# Patient Record
Sex: MALE | Race: WHITE | NOT HISPANIC OR LATINO
[De-identification: names, ages, dates, MRNs, and addresses within clinical notes are randomized per-mention and may not be internally consistent; named-entity substitution may affect disease eponyms.]

---

## 2020-08-10 ENCOUNTER — APPOINTMENT (OUTPATIENT)
Dept: PULMONOLOGY | Facility: CLINIC | Age: 55
End: 2020-08-10
Payer: COMMERCIAL

## 2020-08-10 VITALS
HEIGHT: 70 IN | TEMPERATURE: 98.6 F | OXYGEN SATURATION: 98 % | SYSTOLIC BLOOD PRESSURE: 120 MMHG | WEIGHT: 190 LBS | HEART RATE: 88 BPM | BODY MASS INDEX: 27.2 KG/M2 | DIASTOLIC BLOOD PRESSURE: 70 MMHG

## 2020-08-10 DIAGNOSIS — U07.1 COVID-19: ICD-10-CM

## 2020-08-10 DIAGNOSIS — Z83.3 FAMILY HISTORY OF DIABETES MELLITUS: ICD-10-CM

## 2020-08-10 DIAGNOSIS — Z83.518 FAMILY HISTORY OF OTHER SPECIFIED EYE DISORDER: ICD-10-CM

## 2020-08-10 DIAGNOSIS — Z82.49 FAMILY HISTORY OF ISCHEMIC HEART DISEASE AND OTHER DISEASES OF THE CIRCULATORY SYSTEM: ICD-10-CM

## 2020-08-10 DIAGNOSIS — Z87.19 PERSONAL HISTORY OF OTHER DISEASES OF THE DIGESTIVE SYSTEM: ICD-10-CM

## 2020-08-10 DIAGNOSIS — Z86.39 PERSONAL HISTORY OF OTHER ENDOCRINE, NUTRITIONAL AND METABOLIC DISEASE: ICD-10-CM

## 2020-08-10 DIAGNOSIS — Z78.9 OTHER SPECIFIED HEALTH STATUS: ICD-10-CM

## 2020-08-10 DIAGNOSIS — Z87.898 PERSONAL HISTORY OF OTHER SPECIFIED CONDITIONS: ICD-10-CM

## 2020-08-10 DIAGNOSIS — K50.10 CROHN'S DISEASE OF LARGE INTESTINE W/OUT COMPLICATIONS: ICD-10-CM

## 2020-08-10 DIAGNOSIS — F17.290 NICOTINE DEPENDENCE, OTHER TOBACCO PRODUCT, UNCOMPLICATED: ICD-10-CM

## 2020-08-10 DIAGNOSIS — Z86.19 PERSONAL HISTORY OF OTHER INFECTIOUS AND PARASITIC DISEASES: ICD-10-CM

## 2020-08-10 PROBLEM — Z00.00 ENCOUNTER FOR PREVENTIVE HEALTH EXAMINATION: Status: ACTIVE | Noted: 2020-08-10

## 2020-08-10 PROCEDURE — 94618 PULMONARY STRESS TESTING: CPT

## 2020-08-10 PROCEDURE — 99204 OFFICE O/P NEW MOD 45 MIN: CPT | Mod: 25

## 2020-08-10 RX ORDER — FINASTERIDE 1 MG/1
TABLET, FILM COATED ORAL
Refills: 0 | Status: ACTIVE | COMMUNITY

## 2020-08-10 NOTE — PHYSICAL EXAM
[No Acute Distress] : no acute distress [Normal Oropharynx] : normal oropharynx [II] : Mallampati Class: II [Normal Appearance] : normal appearance [No Neck Mass] : no neck mass [Normal Rate/Rhythm] : normal rate/rhythm [Normal S1, S2] : normal s1, s2 [No Murmurs] : no murmurs [No Abnormalities] : no abnormalities [No Resp Distress] : no resp distress [Clear to Auscultation Bilaterally] : clear to auscultation bilaterally [Benign] : benign [Normal Gait] : normal gait [No Clubbing] : no clubbing [No Edema] : no edema [No Cyanosis] : no cyanosis [Normal Color/ Pigmentation] : normal color/ pigmentation [FROM] : FROM [No Focal Deficits] : no focal deficits [Oriented x3] : oriented x3 [Normal Affect] : normal affect [TextBox_68] : I:E 1:3, mild crackles at the left base

## 2020-08-10 NOTE — PROCEDURE
[FreeTextEntry1] : 6 minute walk test reveals a low saturation of 96% with no evidence of dyspnea or fatigue; walked 668.3 meters \par \par Coronary CT (8.7.2020) reveals mild burden of coronary calcium. Bilateral bronchocentric ground glass opacities in the lower lung and a 2mm noncalcified nodule.\par

## 2020-08-10 NOTE — HISTORY OF PRESENT ILLNESS
[TextBox_4] : Mr. MYRANDA LIRIANO is a 55 year old male coming into the office for an initial evaluation. His chief complaint is abnormal CT. \par -his wife had gotten COVID-19 while visiting her father in the hospital. \par -he had gotten COVID-19 from his wife. \par -he reports having a cough. feeling chest tightness. \par -he states that he had a lot of diarrhea for 2 weeks. \par -he had lost a lot of weight during that time.\par -he later gained his weight back. \par -he is now feeling well.\par -reports a dry cough every once in a while.\par -denies SOB in the middle of the night, orthopnea. \par -reports that he has interrupted sleep.\par -he states that he is a  at night. \par -Memory and Concentration are good. \par -recently has been having a focusing issue. \par -his dreams have been more vivid since being diagnosed with COVID-19.\par -he states that he has very mild Crohn's disease.\par -energy level is 7/10. \par -sinuses have been active a couple of days ago. \par -generally does not have allergies. \par -he does not feel SOB on stairs and inclines. \par \par -He denies any constipation, dysphagia, dizziness, sour taste in the mouth, leg swelling, leg pain, itchy eyes, itchy ears, myalgias or arthralgias.

## 2020-08-10 NOTE — ASSESSMENT
[FreeTextEntry1] : Mr. MYRANDA LIRIANO is a 55 year old male who has a history of Alopecia, GERD, Chicken pox, elevated cholesterol, nonsmoker, GERD, Crohn's colitis, s/p COVID-19 in April 2020. who now comes in for pulmonary evaluation for Abnormal CT \par \par Problem 1:Abnormal CT with Residua COVID-19 Pneumonitis \par -CT is consistent with COVID-19 Pneumonitis. \par -Complete Blood Work : ESR, CRP, Hypersensitivity Panel. \par -Complete a High Resolution CT in One month (September)\par \par -Add a course of Prednisone; 30 mg for 7 days, then 20 mg for 7 days, then 10 mg for 7 days \par -Information sheet given to the patient to be reviewed, this medication is never to be used without consulting the prescribing physician. Proper dietary restraint is necessary specifically salt containing foods, if any reaction may occur should be reported.\par \par Problem 2: GERD\par -Add Pepcid 40mg QHS \par - Things to avoid including overeating, spicy foods, tight clothing, eating within three hours of bed, this list is not all inclusive.\par \par - For treatment of reflux, possible options discussed including diet control, H2 blockers, PPIs, as well as coating motility agents discussed as treatment options. Timing of meals and proximity of last meal to sleep were discussed. If symptoms persist, a formal gastrointestinal evaluation is needed. \par \par Problem 3:?RLS\par -Studies to complete: iron studies, thyroid function test, free and total testosterone level.\par - Restless Legs Syndrome (RLS), also known as Douglas-Ekbom Disease, is a common sleep -related movement disorder. About 1 in 10 adults in the [place] have problems from restless leg syndrome. It also can be seen in about 2% of children. Women are twice as likely as men to have RLS. People with RLS will have symptoms most often during times when they are less active, especially at bedtime. RLS most often causes an overwhelming urge to move your legs and sometimes other parts of your body. This urge is associated with unpleasant sensations in different parts of th body. The symptoms can be mild to severe and can affect your ability to go to sleep and stay asleep. People with RLS often sleep less at night and feel more tired during the day. \par \par Problem 4: ?ERASTO (Neck size 16.5)\par -Complete Home Sleep Study. \par -Sleep apnea is associated with adverse clinical consequences which an affect most organ systems. Cardiovascular disease risk includes arrhythmias, atrial fibrillation, hypertension, coronary artery disease, and stroke. Metabolic disorders include diabetes type 2, non-alcoholic fatty liver disease. Mood disorder especially depression; and cognitive decline especially in the elderly. Associations with chronic reflux/Song’s esophagus some but not all inclusive. \par -Reasons include arousal consistent with hypopnea; respiratory events most prominent in REM sleep or supine position; therefore sleep staging and body position are important for accurate diagnosis and estimation of AHI. \par \par Problem 5:IlDz ?Bronchiectasis \par -Complete Full PFTs and 6 minute walk test. \par \par Problem 6: s/p COVID-19 (April 2020)\par -Complete Blood Test for COVID-19 Antibody\par \par Problem 7: Health maintenance\par -?s/p flu shot\par -recommended strep pneumonia vaccines: Prevnar-13 vaccine, followed by Pneumo vaccine 23 one year following (after the age of 65)\par -recommended early intervention for URIs\par -recommended regular osteoporosis evaluations\par -recommended early dermatological evaluations\par -recommended after the age of 50 to the age of 70, colonoscopy every 5 years\par \par f/u in 6-8 weeks\par pt is encouraged to call or fax the office with any questions or concerns.

## 2020-08-10 NOTE — ADDENDUM
[FreeTextEntry1] : Documented by Gustabo Villalobos acting as a scribe for Dr. Al De La Rosa on 08/10/2020 \par \par All medical record entries made by the Scribe were at my, Dr. Al De La Rosa's, direction and personally dictated by me on 08/10/2020 . I have reviewed the chart and agree that the record accurately reflects my personal performance of the history, physical exam, assessment and plan. I have also personally directed, reviewed, and agree with the discharge instructions.

## 2020-08-10 NOTE — REASON FOR VISIT
[Initial] : an initial visit [TextBox_44] : Abnormal CT, GERD, ?RLS, ?ERASTO, IlDz ?Bronchiectasis, s/p COVID-19.

## 2020-08-11 ENCOUNTER — TRANSCRIPTION ENCOUNTER (OUTPATIENT)
Age: 55
End: 2020-08-11

## 2020-08-11 DIAGNOSIS — Z01.812 ENCOUNTER FOR PREPROCEDURAL LABORATORY EXAMINATION: ICD-10-CM

## 2020-08-11 LAB
ALBUMIN SERPL ELPH-MCNC: 4.7 G/DL
ALP BLD-CCNC: 64 U/L
ALT SERPL-CCNC: 13 U/L
ANION GAP SERPL CALC-SCNC: 11 MMOL/L
AST SERPL-CCNC: 16 U/L
BASOPHILS # BLD AUTO: 0.03 K/UL
BASOPHILS NFR BLD AUTO: 0.7 %
BILIRUB SERPL-MCNC: 0.3 MG/DL
BUN SERPL-MCNC: 22 MG/DL
CALCIUM SERPL-MCNC: 9.2 MG/DL
CHLORIDE SERPL-SCNC: 105 MMOL/L
CHOLEST SERPL-MCNC: 215 MG/DL
CHOLEST/HDLC SERPL: 3.7 RATIO
CO2 SERPL-SCNC: 26 MMOL/L
CREAT SERPL-MCNC: 1.06 MG/DL
CRP SERPL-MCNC: <0.1 MG/DL
EOSINOPHIL # BLD AUTO: 0.11 K/UL
EOSINOPHIL NFR BLD AUTO: 2.4 %
ERYTHROCYTE [SEDIMENTATION RATE] IN BLOOD BY WESTERGREN METHOD: 5 MM/HR
FERRITIN SERPL-MCNC: 101 NG/ML
FERRITIN SERPL-MCNC: 101 NG/ML
GLUCOSE SERPL-MCNC: 112 MG/DL
HCT VFR BLD CALC: 45.2 %
HDLC SERPL-MCNC: 58 MG/DL
HGB BLD-MCNC: 14.1 G/DL
IMM GRANULOCYTES NFR BLD AUTO: 0.2 %
IRON SATN MFR SERPL: 22 %
IRON SATN MFR SERPL: 23 %
IRON SERPL-MCNC: 73 UG/DL
IRON SERPL-MCNC: 74 UG/DL
LDLC SERPL CALC-MCNC: 141 MG/DL
LYMPHOCYTES # BLD AUTO: 1.4 K/UL
LYMPHOCYTES NFR BLD AUTO: 31 %
MAN DIFF?: NORMAL
MCHC RBC-ENTMCNC: 28.3 PG
MCHC RBC-ENTMCNC: 31.2 GM/DL
MCV RBC AUTO: 90.8 FL
MONOCYTES # BLD AUTO: 0.35 K/UL
MONOCYTES NFR BLD AUTO: 7.7 %
NEUTROPHILS # BLD AUTO: 2.62 K/UL
NEUTROPHILS NFR BLD AUTO: 58 %
PLATELET # BLD AUTO: 183 K/UL
POTASSIUM SERPL-SCNC: 5.2 MMOL/L
PROT SERPL-MCNC: 6.7 G/DL
RBC # BLD: 4.98 M/UL
RBC # FLD: 13.5 %
SODIUM SERPL-SCNC: 141 MMOL/L
T3FREE SERPL-MCNC: 2.79 PG/ML
T4 FREE SERPL-MCNC: 1 NG/DL
TIBC SERPL-MCNC: 322 UG/DL
TIBC SERPL-MCNC: 330 UG/DL
TRIGL SERPL-MCNC: 77 MG/DL
TSH SERPL-ACNC: 0.98 UIU/ML
UIBC SERPL-MCNC: 249 UG/DL
UIBC SERPL-MCNC: 256 UG/DL
WBC # FLD AUTO: 4.52 K/UL

## 2020-08-12 LAB
SARS-COV-2 IGG SERPL IA-ACNC: 17.8 INDEX
SARS-COV-2 IGG SERPL QL IA: POSITIVE

## 2020-08-13 LAB
ALTERN TENCAPG(M6): 5.5 MCG/ML
ASPER FUMCAPG(M3): 11.8 MCG/ML
AUREOBASCAPG(M12): 4 MCG/ML
MICROPOLYCAPG(M22): 3.5 MCG/ML
PENIC CHRYCAPG(M1): 7 MCG/ML
PHOMA BETAE IGG: 4.7 MCG/ML
THERMOCAPG(M23): 15.7 MCG/ML
TRICHODERMA VIRIDE IGG: 4.1 MCG/ML

## 2020-08-14 LAB
TESTOST BND SERPL-MCNC: 7.6 PG/ML
TESTOST SERPL-MCNC: 319.6 NG/DL

## 2020-09-02 LAB — SARS-COV-2 N GENE NPH QL NAA+PROBE: NOT DETECTED

## 2020-09-04 ENCOUNTER — APPOINTMENT (OUTPATIENT)
Dept: PULMONOLOGY | Facility: CLINIC | Age: 55
End: 2020-09-04
Payer: COMMERCIAL

## 2020-09-04 PROCEDURE — 94729 DIFFUSING CAPACITY: CPT

## 2020-09-04 PROCEDURE — 94727 GAS DIL/WSHOT DETER LNG VOL: CPT

## 2020-09-04 PROCEDURE — 94010 BREATHING CAPACITY TEST: CPT

## 2020-09-22 ENCOUNTER — APPOINTMENT (OUTPATIENT)
Dept: PULMONOLOGY | Facility: CLINIC | Age: 55
End: 2020-09-22
Payer: COMMERCIAL

## 2020-09-22 VITALS
RESPIRATION RATE: 16 BRPM | SYSTOLIC BLOOD PRESSURE: 120 MMHG | OXYGEN SATURATION: 98 % | BODY MASS INDEX: 29.4 KG/M2 | WEIGHT: 194 LBS | HEIGHT: 68 IN | HEART RATE: 106 BPM | DIASTOLIC BLOOD PRESSURE: 70 MMHG | TEMPERATURE: 98.1 F

## 2020-09-22 DIAGNOSIS — Z23 ENCOUNTER FOR IMMUNIZATION: ICD-10-CM

## 2020-09-22 PROCEDURE — 90682 RIV4 VACC RECOMBINANT DNA IM: CPT

## 2020-09-22 PROCEDURE — 99214 OFFICE O/P EST MOD 30 MIN: CPT | Mod: 25

## 2020-09-22 PROCEDURE — G0008: CPT

## 2020-09-22 NOTE — PHYSICAL EXAM

## 2020-09-22 NOTE — PROCEDURE
[FreeTextEntry1] : CT (SEP.9.2020) IMPRESSION: GROUNDGLASS nodularity, scarring or inflammation right lower lobe and to lesser extent left lower lung. similar finding 8/7/2020. Several punctate calcified granulomas costophrenic angles. 3 x  mm nodule lingula. similar dining partially visualized 8/7/2020/ Consider follow up study in 6 months.

## 2020-09-22 NOTE — REASON FOR VISIT
[Follow-Up] : a follow-up visit [TextBox_44] : Abnormal CT, GERD, ?RLS, ?ERASTO, IlDz ?Bronchiectasis, s/p COVID-19.

## 2020-09-22 NOTE — ASSESSMENT
[FreeTextEntry1] : Mr. MYRANDA LIRIANO is a 55 year old male who has a history of Alopecia, GERD, Chicken pox, elevated cholesterol, nonsmoker, GERD, Crohn's colitis, s/p COVID-19 in April 2020. who now comes in for a follow up pulmonary evaluation for Abnormal CT \par \par Problem 1:Abnormal CT with Residua COVID-19 Pneumonitis \par -CT is consistent with COVID-19 Pneumonitis. \par -Complete Blood Work : ESR, CRP, Hypersensitivity Panel. \par -s/p High Resolution CT (September) - residual. Complete next CT 3/2021\par \par \par Problem 2: GERD\par -continue Pepcid 40mg QHS \par - Things to avoid including overeating, spicy foods, tight clothing, eating within three hours of bed, this list is not all inclusive.\par - For treatment of reflux, possible options discussed including diet control, H2 blockers, PPIs, as well as coating motility agents discussed as treatment options. Timing of meals and proximity of last meal to sleep were discussed. If symptoms persist, a formal gastrointestinal evaluation is needed. \par \par Problem 3:?RLS\par -Studies to complete: iron studies, thyroid function test, free and total testosterone level.\par - Restless Legs Syndrome (RLS), also known as Douglas-Ekbom Disease, is a common sleep -related movement disorder. About 1 in 10 adults in the [place] have problems from restless leg syndrome. It also can be seen in about 2% of children. Women are twice as likely as men to have RLS. People with RLS will have symptoms most often during times when they are less active, especially at bedtime. RLS most often causes an overwhelming urge to move your legs and sometimes other parts of your body. This urge is associated with unpleasant sensations in different parts of th body. The symptoms can be mild to severe and can affect your ability to go to sleep and stay asleep. People with RLS often sleep less at night and feel more tired during the day. \par \par Problem 4: ?ERASTO (Neck size 16.5)\par -Complete Home Sleep Study. \par -Sleep apnea is associated with adverse clinical consequences which an affect most organ systems. Cardiovascular disease risk includes arrhythmias, atrial fibrillation, hypertension, coronary artery disease, and stroke. Metabolic disorders include diabetes type 2, non-alcoholic fatty liver disease. Mood disorder especially depression; and cognitive decline especially in the elderly. Associations with chronic reflux/Song’s esophagus some but not all inclusive. \par -Reasons include arousal consistent with hypopnea; respiratory events most prominent in REM sleep or supine position; therefore sleep staging and body position are important for accurate diagnosis and estimation of AHI. \par \par Problem 5:IlDz ?Bronchiectasis \par -s/p Full PFTs and 6 minute walk test. \par \par Problem 6: s/p COVID-19 (April 2020)\par -s/p Blood Test for COVID-19 Antibody (+)\par \par Problem 7: Health maintenance\par -?s/p flu shot\par -recommended strep pneumonia vaccines: Prevnar-13 vaccine, followed by Pneumo vaccine 23 one year following (after the age of 65)\par -recommended early intervention for URIs\par -recommended regular osteoporosis evaluations\par -recommended early dermatological evaluations\par -recommended after the age of 50 to the age of 70, colonoscopy every 5 years\par \par f/u in 4 months with a CT\par pt is encouraged to call or fax the office with any questions or concerns.

## 2020-09-22 NOTE — HISTORY OF PRESENT ILLNESS
[TextBox_4] : Mr. MYRANDA LIRIANO is a 55 year old male with a history of abnormal CT, GERD, PNA from Covid-19, poor sleep, RLS, and SOB coming into the office for a follow up pulmonary evaluation. His chief complaint is s/p covid\par -he reports feeling generally well\par -he states he feels tired by 10 PM now. He wakes up at 7 AM. He gets 6-7 hours of sleep nightly\par -he began to run 1-2 miles 3x per week\par -he reports he wakes up with nocturia every few days. His wife adds he rarely snores\par -he notes he has been having more and odd dreams, different from before Covid\par -he denies any visual issues, chest pain, chest pressure, diarrhea, constipation, dysphagia, dizziness, sour taste in the mouth, leg swelling, leg pain, itchy eyes, itchy ears, heartburn, reflux, myalgias or arthralgias.

## 2020-09-22 NOTE — ADDENDUM
[FreeTextEntry1] : Documented by Charles Tran acting as a scribe for Dr. Al De La Rosa on 09/22/2020.\par \par All medical record entries made by the Scribe were at my, Dr. Al De La Rosa's, direction and personally dictated by me on 09/22/2020. I have reviewed the chart and agree that the record accurately reflects my personal performance of the history, physical exam, assessment and plan. I have also personally directed, reviewed, and agree with the discharge instructions.

## 2021-02-23 ENCOUNTER — NON-APPOINTMENT (OUTPATIENT)
Age: 56
End: 2021-02-23

## 2021-04-14 LAB — SARS-COV-2 N GENE NPH QL NAA+PROBE: NOT DETECTED

## 2021-04-16 ENCOUNTER — APPOINTMENT (OUTPATIENT)
Dept: PULMONOLOGY | Facility: CLINIC | Age: 56
End: 2021-04-16
Payer: COMMERCIAL

## 2021-04-16 VITALS
OXYGEN SATURATION: 98 % | DIASTOLIC BLOOD PRESSURE: 70 MMHG | WEIGHT: 194 LBS | HEART RATE: 68 BPM | TEMPERATURE: 98 F | HEIGHT: 69 IN | RESPIRATION RATE: 16 BRPM | SYSTOLIC BLOOD PRESSURE: 110 MMHG | BODY MASS INDEX: 28.73 KG/M2

## 2021-04-16 PROCEDURE — ZZZZZ: CPT

## 2021-04-16 PROCEDURE — 95012 NITRIC OXIDE EXP GAS DETER: CPT

## 2021-04-16 PROCEDURE — 99072 ADDL SUPL MATRL&STAF TM PHE: CPT

## 2021-04-16 PROCEDURE — 94010 BREATHING CAPACITY TEST: CPT

## 2021-04-16 PROCEDURE — 94729 DIFFUSING CAPACITY: CPT

## 2021-04-16 PROCEDURE — 94618 PULMONARY STRESS TESTING: CPT

## 2021-04-16 PROCEDURE — 99213 OFFICE O/P EST LOW 20 MIN: CPT | Mod: 25

## 2021-04-16 NOTE — HISTORY OF PRESENT ILLNESS
[TextBox_4] : Mr. MYRANDA LIRIANO is a 55 year old male with a history of abnormal CT, GERD, PNA from Covid-19, poor sleep, RLS, and SOB coming into the office for a follow up pulmonary evaluation. His chief complaint is\par - he has been feeling fine overall \par - he notes he has been having very vivid dreams \par - no SOB \par - He has been exercising \par - he has a dry cough once in awhile \par - no palpitations \par - denies any snoring \par - his sleep has been well \par - He  denies any visual issues, headaches, nausea, vomiting, fever, chills, sweats, chest pains, chest pressure, diarrhea, constipation, dysphagia, myalgia, dizziness, leg swelling, leg pain, itchy eyes, itchy ears, heartburn, reflux, or sour taste in the mouth.\par

## 2021-04-16 NOTE — PHYSICAL EXAM
[No Acute Distress] : no acute distress [Normal Oropharynx] : normal oropharynx [Normal Appearance] : normal appearance [No Neck Mass] : no neck mass [Normal Rate/Rhythm] : normal rate/rhythm [Normal S1, S2] : normal s1, s2 [No Murmurs] : no murmurs [No Resp Distress] : no resp distress [Clear to Auscultation Bilaterally] : clear to auscultation bilaterally [No Abnormalities] : no abnormalities [Benign] : benign [Normal Gait] : normal gait [No Clubbing] : no clubbing [No Cyanosis] : no cyanosis [No Edema] : no edema [FROM] : FROM [Normal Color/ Pigmentation] : normal color/ pigmentation [No Focal Deficits] : no focal deficits [Oriented x3] : oriented x3 [Normal Affect] : normal affect [III] : Mallampati Class: III [TextBox_68] : I:E 1:3, clear

## 2021-04-16 NOTE — PROCEDURE
[FreeTextEntry1] : CT (2/5/2021) reveals mild gg opacity in the posterior lower lobes bilaterally, unchanged. Stable tiny bilateral pulmonary nodules \par \par 6 minute walk test reveals a low saturation of 97 % with no evidence of dyspnea or fatigue; walked  586.8 meters \par \par Full PFT revealed normal flows, with a FEV1 of 4.28L, which is 113% of predicted, normal lung volumes, and a diffusion of 25.1, which is 97% of predicted, with a normal flow volume loop \par \par  FENO was 25; normal value being less than 25\par Fractional exhaled nitric oxide (FENO) is regarded as a simple, noninvasive method for assessing eosinophilic airway inflammation. Produced by a variety of cells within the lung, nitric oxide (NO) concentrations are generally low in healthy individuals. However, high concentrations of NO appear to be involved in nonspecific host defense mechanisms and chronic inflammatory diseases such as asthma. The American Thor[employer] Society (ATS) therefore has recommended using FENO to aid in the diagnosis and monitoring of eosinophilic airway inflammation and asthma, and for identifying steroid responsive individuals whose chronic respiratory symptoms may be airway inflammation.

## 2021-04-16 NOTE — ASSESSMENT
[FreeTextEntry1] : Mr. MYRANDA LIRIANO is a 55 year old male who has a history of Alopecia, GERD, Chicken pox, elevated cholesterol, nonsmoker, GERD, Crohn's colitis, s/p COVID-19 in April 2020. who now comes in for a follow up pulmonary evaluation for Abnormal CT - improved \par \par Problem 1:Abnormal CT with Residua COVID-19 Pneumonitis (improved)\par -CT is consistent with COVID-19 Pneumonitis. \par -s/p Blood Work : ESR, CRP, Hypersensitivity Panel. \par -s/p High Resolution CT (September) - residual. s/p CT 3/2021-next  3/2022\par \par \par Problem 2: GERD\par -continue Pepcid 40mg QHS \par - Things to avoid including overeating, spicy foods, tight clothing, eating within three hours of bed, this list is not all inclusive.\par - For treatment of reflux, possible options discussed including diet control, H2 blockers, PPIs, as well as coating motility agents discussed as treatment options. Timing of meals and proximity of last meal to sleep were discussed. If symptoms persist, a formal gastrointestinal evaluation is needed. \par \par Problem 3:?RLS\par -Studies to complete: iron studies, thyroid function test, free and total testosterone level.\par - Restless Legs Syndrome (RLS), also known as Douglas-Ekbom Disease, is a common sleep -related movement disorder. About 1 in 10 adults in the [place] have problems from restless leg syndrome. It also can be seen in about 2% of children. Women are twice as likely as men to have RLS. People with RLS will have symptoms most often during times when they are less active, especially at bedtime. RLS most often causes an overwhelming urge to move your legs and sometimes other parts of your body. This urge is associated with unpleasant sensations in different parts of th body. The symptoms can be mild to severe and can affect your ability to go to sleep and stay asleep. People with RLS often sleep less at night and feel more tired during the day. \par \par Problem 4: ?ERASTO (Neck size 16.5)\par -Complete Home Sleep Study - if needed \par -Sleep apnea is associated with adverse clinical consequences which an affect most organ systems. Cardiovascular disease risk includes arrhythmias, atrial fibrillation, hypertension, coronary artery disease, and stroke. Metabolic disorders include diabetes type 2, non-alcoholic fatty liver disease. Mood disorder especially depression; and cognitive decline especially in the elderly. Associations with chronic reflux/Song’s esophagus some but not all inclusive. \par -Reasons include arousal consistent with hypopnea; respiratory events most prominent in REM sleep or supine position; therefore sleep staging and body position are important for accurate diagnosis and estimation of AHI. \par \par Problem 5:IlDz ?Bronchiectasis \par -s/p Full PFTs and 6 minute walk test. \par \par Problem 6: s/p COVID-19 (April 2020) - vaccine concerns \par -s/p Blood Test for COVID-19 Antibody (+)\par \par Problem 7: Health maintenance\par -?s/p flu shot\par -recommended strep pneumonia vaccines: Prevnar-13 vaccine, followed by Pneumo vaccine 23 one year following (after the age of 65)\par -recommended early intervention for URIs\par -recommended regular osteoporosis evaluations\par -recommended early dermatological evaluations\par -recommended after the age of 50 to the age of 70, colonoscopy every 5 years\par \par f/u in 4 months with a CT\par pt is encouraged to call or fax the office with any questions or concerns.

## 2021-04-16 NOTE — ADDENDUM
[FreeTextEntry1] : Documented by Hafsa Nj acting as a scribe for Dr. Al De La Rosa on 04/16/2021 \par \par All medical record entries made by the Scribe were at my, Dr. Al De La Rosa's, direction and personally dictated by me on 04/16/2021 . I have reviewed the chart and agree that the record accurately reflects my personal performance of the history, physical exam, assessment and plan. I have also personally directed, reviewed, and agree with the discharge instructions.

## 2022-06-24 ENCOUNTER — NON-APPOINTMENT (OUTPATIENT)
Age: 57
End: 2022-06-24

## 2022-06-24 ENCOUNTER — APPOINTMENT (OUTPATIENT)
Dept: PULMONOLOGY | Facility: CLINIC | Age: 57
End: 2022-06-24
Payer: COMMERCIAL

## 2022-06-24 VITALS
OXYGEN SATURATION: 98 % | DIASTOLIC BLOOD PRESSURE: 72 MMHG | HEIGHT: 71 IN | TEMPERATURE: 97.3 F | WEIGHT: 191 LBS | BODY MASS INDEX: 26.74 KG/M2 | RESPIRATION RATE: 16 BRPM | HEART RATE: 74 BPM | SYSTOLIC BLOOD PRESSURE: 120 MMHG

## 2022-06-24 DIAGNOSIS — Z72.820 SLEEP DEPRIVATION: ICD-10-CM

## 2022-06-24 PROCEDURE — 94010 BREATHING CAPACITY TEST: CPT

## 2022-06-24 PROCEDURE — 95012 NITRIC OXIDE EXP GAS DETER: CPT

## 2022-06-24 PROCEDURE — 99214 OFFICE O/P EST MOD 30 MIN: CPT | Mod: 25

## 2022-06-24 RX ORDER — ROSUVASTATIN CALCIUM 10 MG/1
10 TABLET, FILM COATED ORAL
Qty: 30 | Refills: 0 | Status: ACTIVE | COMMUNITY
Start: 2021-11-26

## 2022-06-24 RX ORDER — VALACYCLOVIR 1 G/1
1 TABLET, FILM COATED ORAL
Qty: 30 | Refills: 0 | Status: ACTIVE | COMMUNITY
Start: 2022-06-13

## 2022-06-24 RX ORDER — PANTOPRAZOLE 40 MG/1
40 TABLET, DELAYED RELEASE ORAL
Qty: 30 | Refills: 0 | Status: ACTIVE | COMMUNITY
Start: 2022-02-07

## 2022-06-24 RX ORDER — MESALAMINE 400 MG/1
400 CAPSULE, DELAYED RELEASE ORAL
Qty: 180 | Refills: 0 | Status: ACTIVE | COMMUNITY
Start: 2022-05-17

## 2022-06-24 NOTE — ASSESSMENT
[FreeTextEntry1] : Mr. MYRANDA LIRIANO is a 57 year old male who has a history of Alopecia, GERD, Chicken pox, elevated cholesterol, nonsmoker, GERD, Crohn's colitis, s/p COVID-19 in April 2020. who now comes in for a follow up pulmonary evaluation for Abnormal CT - improved - s/p COVID-19 5/2022 \par \par Problem 1:Abnormal CT with Residua COVID-19 Pneumonitis (improved)\par -CT is consistent with COVID-19 Pneumonitis. \par -s/p Blood Work : ESR, CRP, Hypersensitivity Panel. \par -s/p High Resolution CT (September) - residual. s/p CT 3/2021-next  3/2022 (over due)\par -CAT scans are the only radiological modality to identify abnormalities w/in the lings with regards to nodules/masses/lymph nodes. Risks, benefits were reviewed in detail. The guidelines for abnormalities include follow up CT scans at various intervals which could range from 6 weeks to 1 year intervals. If there is a change for the worse then considerations for a biopsy will be considered if you are a candidate. Second opinion evaluation with thoracic surgeon or an interventional radiologist could be offered,			 \par \par Problem 2: GERD\par -continue Pepcid 40mg QHS \par - Things to avoid including overeating, spicy foods, tight clothing, eating within three hours of bed, this list is not all inclusive.\par - For treatment of reflux, possible options discussed including diet control, H2 blockers, PPIs, as well as coating motility agents discussed as treatment options. Timing of meals and proximity of last meal to sleep were discussed. If symptoms persist, a formal gastrointestinal evaluation is needed. \par \par Problem 3:?RLS\par -Studies to complete: iron studies, thyroid function test, free and total testosterone level.\par - Restless Legs Syndrome (RLS), also known as Douglas-Ekbom Disease, is a common sleep -related movement disorder. About 1 in 10 adults in the [place] have problems from restless leg syndrome. It also can be seen in about 2% of children. Women are twice as likely as men to have RLS. People with RLS will have symptoms most often during times when they are less active, especially at bedtime. RLS most often causes an overwhelming urge to move your legs and sometimes other parts of your body. This urge is associated with unpleasant sensations in different parts of th body. The symptoms can be mild to severe and can affect your ability to go to sleep and stay asleep. People with RLS often sleep less at night and feel more tired during the day. \par \par Problem 4: ?ERASTO (Neck size 16.5)\par -Complete Home Sleep Study - if needed \par -Sleep apnea is associated with adverse clinical consequences which an affect most organ systems. Cardiovascular disease risk includes arrhythmias, atrial fibrillation, hypertension, coronary artery disease, and stroke. Metabolic disorders include diabetes type 2, non-alcoholic fatty liver disease. Mood disorder especially depression; and cognitive decline especially in the elderly. Associations with chronic reflux/Song’s esophagus some but not all inclusive. \par -Reasons include arousal consistent with hypopnea; respiratory events most prominent in REM sleep or supine position; therefore sleep staging and body position are important for accurate diagnosis and estimation of AHI. \par \par Problem 5:IlDz ?Bronchiectasis \par -s/p Full PFTs and 6 minute walk test. \par -Seen on the CT of the chest or chest x-ray signifies damaged bronchial tubes focal or diffuse which can be sites of recurrent infections. These areas can be colonized by various organisms including bacteria (hemophilous influenza/Pseudomonas species etc.) as well as acid fast bacilli (myobacterial disease- inclusive of TB/NIMCO etc.). Sputum either for bacteria culture/sensitivity or AFB culture and sensitivity will need to be sent if the patient has sputum- 3 specimens on consecutive days will need to be dropped at the laboratory- if the patient can produce sputum. \par \par Problem 6: s/p COVID-19 (4/2020, 5/2022) - vaccine concerns \par -Recommended Frank Kowalski's 10-day detox diet and book. Then, complete a course of probiotics.\par -s/p Blood Test for COVID-19 Antibody (+)\par \par Problem 7: Health maintenance\par -recommended book "[employer]" by Ta Rebollar\par -?s/p flu shot\par -recommended strep pneumonia vaccines: Prevnar-13 vaccine, followed by Pneumo vaccine 23 one year following (after the age of 65)\par -recommended early intervention for URIs\par -recommended regular osteoporosis evaluations\par -recommended early dermatological evaluations\par -recommended after the age of 50 to the age of 70, colonoscopy every 5 years\par \par f/u in 4 months with a CT\par pt is encouraged to call or fax the office with any questions or concerns.

## 2022-06-24 NOTE — PROCEDURE
[FreeTextEntry1] : Feno was 28; a normal value being less than 25. Fractional exhaled nitric oxide (FENO) is regarded as a simple, noninvasive method for assessing eosinophilic airway inflammation. Produced by a variety of cells within the lung, nitric oxide (NO) concentrations are generally low in healthy individuals. However, high concentrations of NO appear to be involved in nonspecific host defense mechanisms and chronic inflammatory  diseases such as asthma. The American Thoracic Society (ATS) therefore recommended using FENO to aid in the diagnosis and monitoring of eosinophilic airway inflammation and asthma, and for identifying steroid responsive individuals whose chronic respiratory symptoms may be caused by airway inflammation \par \par PFT revealed normal flows, with a FEV1 of 4.36L, which is 112% of predicted, with a normal flow volume loop

## 2022-06-24 NOTE — PHYSICAL EXAM
[No Acute Distress] : no acute distress [Normal Oropharynx] : normal oropharynx [Normal Appearance] : normal appearance [No Neck Mass] : no neck mass [Normal Rate/Rhythm] : normal rate/rhythm [Normal S1, S2] : normal s1, s2 [No Murmurs] : no murmurs [No Resp Distress] : no resp distress [Clear to Auscultation Bilaterally] : clear to auscultation bilaterally [No Abnormalities] : no abnormalities [Benign] : benign [Normal Gait] : normal gait [No Clubbing] : no clubbing [No Cyanosis] : no cyanosis [No Edema] : no edema [FROM] : FROM [Normal Color/ Pigmentation] : normal color/ pigmentation [No Focal Deficits] : no focal deficits [Oriented x3] : oriented x3 [Normal Affect] : normal affect [II] : Mallampati Class: II [TextBox_68] : I:E 1:3, clear

## 2022-06-24 NOTE — HISTORY OF PRESENT ILLNESS
[TextBox_4] : Mr. MYRANDA LIRIANO is a 57 year old male with a history of abnormal CT, GERD, PNA from Covid-19, poor sleep, RLS, and SOB coming into the office for a follow up pulmonary evaluation. His chief complaint is\par -he notes that he has another COVID infection about a month ago (5/2022)\par -he notes that the infection stayed above the neck (did develop dry cough which resolved and had fatigue)\par -s/p Paxlovid (metallic taste Sx)\par -he notes that he is sleeping well \par -he notes that he gets tired at 5-6 PM and takes a nap \par -he notes he gets up earlier in the morning (6AM, used to wake up 7 or 8 AM)\par -he notes snoring \par -he notes losing some weight\par -he notes exercising (cardio, mild weightlifting)\par -he notes he had a herniated disc in his C spine a few years ago\par -he notes being on and off statins\par -he notes that he had mild colitis a while ago which was largely resolved but gets small bouts every now and then (cramps, diarrhea intermittently)\par -he denies SOB\par -he notes that he is traveling to [facility] soon\par -he notes that she could sleep an extra hour\par -he notes that had major GI Sx with first COVID infection\par \par -patient denies any headaches, nausea, vomiting, fever, chills, sweats, chest pain, chest pressure, palpitations, coughing, wheezing, fatigue, constipation, dysphagia, dizziness, leg swelling, leg pain, itchy eyes, itchy ears, heartburn, reflux or sour taste in the mouth

## 2022-06-24 NOTE — ADDENDUM
[FreeTextEntry1] : Documented by Scot Mcdonald acting as a scribe for Dr. Al De La Rosa on 06/24/2022.\par \par All medical record entries made by the Scribe were at my, Dr. Al De La Rosa's, direction and personally dictated by me on 06/24/2022. I have reviewed the chart and agree that the record accurately reflects my personal performance of the history, physical exam, assessment and plan. I have also personally directed, reviewed, and agree with the discharge instructions.

## 2022-09-12 ENCOUNTER — OUTPATIENT (OUTPATIENT)
Dept: OUTPATIENT SERVICES | Facility: HOSPITAL | Age: 57
LOS: 1 days | End: 2022-09-12
Payer: COMMERCIAL

## 2022-09-12 ENCOUNTER — APPOINTMENT (OUTPATIENT)
Dept: CT IMAGING | Facility: CLINIC | Age: 57
End: 2022-09-12

## 2022-09-12 DIAGNOSIS — U07.1 COVID-19: ICD-10-CM

## 2022-09-12 PROCEDURE — 71250 CT THORAX DX C-: CPT

## 2022-09-12 PROCEDURE — 71250 CT THORAX DX C-: CPT | Mod: 26

## 2022-09-15 ENCOUNTER — NON-APPOINTMENT (OUTPATIENT)
Age: 57
End: 2022-09-15

## 2022-09-15 ENCOUNTER — TRANSCRIPTION ENCOUNTER (OUTPATIENT)
Age: 57
End: 2022-09-15

## 2022-09-16 ENCOUNTER — LABORATORY RESULT (OUTPATIENT)
Age: 57
End: 2022-09-16

## 2022-09-16 ENCOUNTER — NON-APPOINTMENT (OUTPATIENT)
Age: 57
End: 2022-09-16

## 2022-09-16 ENCOUNTER — APPOINTMENT (OUTPATIENT)
Dept: CARDIOLOGY | Facility: CLINIC | Age: 57
End: 2022-09-16

## 2022-09-16 VITALS
OXYGEN SATURATION: 98 % | TEMPERATURE: 97.8 F | SYSTOLIC BLOOD PRESSURE: 125 MMHG | DIASTOLIC BLOOD PRESSURE: 78 MMHG | HEART RATE: 60 BPM | WEIGHT: 194 LBS | HEIGHT: 71 IN | RESPIRATION RATE: 16 BRPM | BODY MASS INDEX: 27.16 KG/M2

## 2022-09-16 DIAGNOSIS — R01.1 CARDIAC MURMUR, UNSPECIFIED: ICD-10-CM

## 2022-09-16 DIAGNOSIS — R06.09 OTHER FORMS OF DYSPNEA: ICD-10-CM

## 2022-09-16 DIAGNOSIS — I25.10 ATHEROSCLEROTIC HEART DISEASE OF NATIVE CORONARY ARTERY W/OUT ANGINA PECTORIS: ICD-10-CM

## 2022-09-16 DIAGNOSIS — I25.84 ATHEROSCLEROTIC HEART DISEASE OF NATIVE CORONARY ARTERY W/OUT ANGINA PECTORIS: ICD-10-CM

## 2022-09-16 DIAGNOSIS — E78.00 PURE HYPERCHOLESTEROLEMIA, UNSPECIFIED: ICD-10-CM

## 2022-09-16 PROCEDURE — 99204 OFFICE O/P NEW MOD 45 MIN: CPT | Mod: 25

## 2022-09-16 PROCEDURE — 93000 ELECTROCARDIOGRAM COMPLETE: CPT

## 2022-09-16 NOTE — PHYSICAL EXAM
[Well Developed] : well developed [Well Nourished] : well nourished [No Acute Distress] : no acute distress [Normal Conjunctiva] : normal conjunctiva [Normal Venous Pressure] : normal venous pressure [No Carotid Bruit] : no carotid bruit [Normal S1, S2] : normal S1, S2 [No Rub] : no rub [5th Left ICS - MCL] : palpated at the 5th LICS in the midclavicular line [Normal] : normal [No Precordial Heave] : no precordial heave was noted [Normal Rate] : normal [Normal S1] : normal S1 [Normal S2] : normal S2 [No Gallop] : no gallop heard [No Pitting Edema] : no pitting edema present [2+] : left 2+ [No Abnormalities] : the abdominal aorta was not enlarged and no bruit was heard [Clear Lung Fields] : clear lung fields [Good Air Entry] : good air entry [No Respiratory Distress] : no respiratory distress  [Soft] : abdomen soft [Non Tender] : non-tender [No Masses/organomegaly] : no masses/organomegaly [Normal Bowel Sounds] : normal bowel sounds [Normal Gait] : normal gait [No Edema] : no edema [No Cyanosis] : no cyanosis [No Clubbing] : no clubbing [No Varicosities] : no varicosities [No Rash] : no rash [No Skin Lesions] : no skin lesions [Moves all extremities] : moves all extremities [No Focal Deficits] : no focal deficits [Normal Speech] : normal speech [Alert and Oriented] : alert and oriented [Normal memory] : normal memory [S3] : no S3 [S4] : no S4 [Right Carotid Bruit] : no bruit heard over the right carotid [Left Carotid Bruit] : no bruit heard over the left carotid [Right Femoral Bruit] : no bruit heard over the right femoral artery [Left Femoral Bruit] : no bruit heard over the left femoral artery

## 2022-09-16 NOTE — DISCUSSION/SUMMARY
[FreeTextEntry1] : This is a 57-year-old male with past medical history significant for mild coronary artery calcification, borderline cholesterol, status post COVID-19 infection in 2020, and May 2022, dyspepsia, who comes in for cardiac consultation.\par He denies chest pain, shortness of breath, dizziness or syncope.  He has no history of rheumatic fever.  He does not drink excessive caffeine or alcohol.\par His cardiac risk factors include borderline elevation in cholesterol.\par (His father myocardial infarction age 95 and received a stent)\par Electrocardiogram done September 16, 2022 demonstrates sinus bradycardia rate of 55 bpm and is otherwise remarkable for nonspecific ST–T wave change.\par The patient had a coronary artery calcium score done August 7, 2020 which demonstrated 12 units in the left anterior descending artery and 0 units in the remainder of the coronary tree.\par Patient had initially put himself on Crestor 10 mg daily, which he discontinued after some vague muscle aches.\par Lipid panel done November 19, 2021 demonstrated cholesterol 152, triglycerides 78, HDL of 48, LDL cholesterol calculated of 89 mg/dL with hemoglobin A1c of 5.9.\par The patient then restarted Crestor 10 mg/day.  He is currently on Crestor 10 mg/day therapy.\par Given the presence of coronary artery calcification, the patient's LDL target is 70 mg/dL.\par I have asked him to increase his Crestor to 20 mg/day and repeat his blood work in 6 to 8 weeks.  He will also start coenzyme Q 10.\par The patient exercises on a regular basis, running a few miles per week.  He feels that his diet can improve.  I have recommended he work with a registered dietitian, to assist with his heart healthy diet and caloric reduction.\par He will schedule exercise stress test to rule out significant coronary artery disease.\par He will schedule echo Doppler examination to evaluate his left ventricular function, chamber size, and rule out hypertrophy.\par He will follow-up with me after above-noted diagnostic tests are completed.  He will have blood work done today for lipoprotein a, lipoprotein B, and lipid panel.\par The patient understands that aerobic exercises must be increased to 40 minutes 4 times per week. A detailed discussion of lifestyle modification was done today. The patient has a good understanding of the diagnosis, and treatment plan. Lifestyle modification was also outlined.

## 2022-09-16 NOTE — REASON FOR VISIT
[CV Risk Factors and Non-Cardiac Disease] : CV risk factors and non-cardiac disease [FreeTextEntry1] : This is a 57 year old male patient with a past medical history of Alopecia, GERD, hypercholesterolemia, Crohn's colitis, s/p COVID-19 in April 2020 and 5/2022 who comes in for lipid consultation. Patient is accompanied by wife in room.\par \par Patient states he is generally feeling well today but reports occasional dyspnea on exertion when going up stairs. \par \par Patient had a total coronary calcium score of 12.12 (LAD) on 8/7/20. Patient provided recent bloodwork from 9/13/22 that demonstrated cholesterol 224, triglycerides 117, HDL 51, calculated . Patient is currently taking Rosuvastatin 10mg QD. \par \par Patient states his father had an MI last year at the age of 95 (1 vessel was over 90% occluded and he had 1 stent placed).\par \par Patient states he exercises 3 times a week and runs 2 miles at a time. Patient reports normal exercise stress echo a few years ago.

## 2022-09-19 RX ORDER — CLARITHROMYCIN 500 MG/1
500 TABLET, FILM COATED ORAL
Qty: 20 | Refills: 0 | Status: COMPLETED | COMMUNITY
Start: 2020-08-19 | End: 2022-09-19

## 2022-09-19 RX ORDER — DICLOFENAC SODIUM 100 MG/1
100 TABLET, FILM COATED, EXTENDED RELEASE ORAL
Qty: 30 | Refills: 0 | Status: COMPLETED | COMMUNITY
Start: 2022-02-03 | End: 2022-09-19

## 2022-09-19 RX ORDER — NIRMATRELVIR AND RITONAVIR 300-100 MG
20 X 150 MG & KIT ORAL
Qty: 30 | Refills: 0 | Status: COMPLETED | COMMUNITY
Start: 2022-06-02 | End: 2022-09-19

## 2022-09-19 RX ORDER — METHYLPREDNISOLONE 4 MG/1
4 TABLET ORAL
Qty: 21 | Refills: 0 | Status: COMPLETED | COMMUNITY
Start: 2022-06-02 | End: 2022-09-19

## 2022-09-19 RX ORDER — FAMOTIDINE 20 MG/1
20 TABLET, FILM COATED ORAL
Refills: 0 | Status: COMPLETED | COMMUNITY
End: 2022-09-19

## 2022-09-19 RX ORDER — ACYCLOVIR 50 MG/G
5 CREAM TOPICAL
Qty: 5 | Refills: 0 | Status: COMPLETED | COMMUNITY
Start: 2022-06-13 | End: 2022-09-19

## 2022-09-19 RX ORDER — PREDNISONE 10 MG/1
10 TABLET ORAL
Qty: 50 | Refills: 0 | Status: COMPLETED | COMMUNITY
Start: 2020-08-10 | End: 2022-09-19

## 2022-12-06 ENCOUNTER — APPOINTMENT (OUTPATIENT)
Dept: PULMONOLOGY | Facility: CLINIC | Age: 57
End: 2022-12-06

## 2022-12-06 VITALS
TEMPERATURE: 97.2 F | WEIGHT: 195 LBS | DIASTOLIC BLOOD PRESSURE: 78 MMHG | HEIGHT: 70 IN | SYSTOLIC BLOOD PRESSURE: 110 MMHG | HEART RATE: 69 BPM | BODY MASS INDEX: 27.92 KG/M2 | OXYGEN SATURATION: 98 % | RESPIRATION RATE: 16 BRPM

## 2022-12-06 DIAGNOSIS — J12.82 COVID-19: ICD-10-CM

## 2022-12-06 DIAGNOSIS — U07.1 COVID-19: ICD-10-CM

## 2022-12-06 DIAGNOSIS — R93.89 ABNORMAL FINDINGS ON DIAGNOSTIC IMAGING OF OTHER SPECIFIED BODY STRUCTURES: ICD-10-CM

## 2022-12-06 DIAGNOSIS — R06.02 SHORTNESS OF BREATH: ICD-10-CM

## 2022-12-06 DIAGNOSIS — G25.81 RESTLESS LEGS SYNDROME: ICD-10-CM

## 2022-12-06 DIAGNOSIS — Z86.16 PERSONAL HISTORY OF COVID-19: ICD-10-CM

## 2022-12-06 DIAGNOSIS — K21.9 GASTRO-ESOPHAGEAL REFLUX DISEASE W/OUT ESOPHAGITIS: ICD-10-CM

## 2022-12-06 PROCEDURE — 95012 NITRIC OXIDE EXP GAS DETER: CPT

## 2022-12-06 PROCEDURE — 94010 BREATHING CAPACITY TEST: CPT

## 2022-12-06 PROCEDURE — 94727 GAS DIL/WSHOT DETER LNG VOL: CPT

## 2022-12-06 PROCEDURE — ZZZZZ: CPT

## 2022-12-06 PROCEDURE — 99214 OFFICE O/P EST MOD 30 MIN: CPT | Mod: 25

## 2022-12-06 PROCEDURE — 94729 DIFFUSING CAPACITY: CPT

## 2022-12-06 RX ORDER — OSELTAMIVIR PHOSPHATE 75 MG/1
75 CAPSULE ORAL
Qty: 10 | Refills: 0 | Status: DISCONTINUED | COMMUNITY
Start: 2022-11-23

## 2022-12-06 RX ORDER — AMOXICILLIN 500 MG/1
500 CAPSULE ORAL
Qty: 45 | Refills: 0 | Status: DISCONTINUED | COMMUNITY
Start: 2022-08-24

## 2022-12-06 RX ORDER — ROSUVASTATIN CALCIUM 20 MG/1
20 TABLET, FILM COATED ORAL
Qty: 30 | Refills: 0 | Status: DISCONTINUED | COMMUNITY
Start: 2022-10-03

## 2022-12-06 NOTE — ASSESSMENT
[FreeTextEntry1] : Mr. MYRANDA LIRIANO is a 57 year old male who has a history of Alopecia, GERD, Chicken pox, elevated cholesterol, nonsmoker, GERD, Crohn's colitis, s/p COVID-19 in April 2020. 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 \par -s/p Blood Work : ESR, CRP, Hypersensitivity Panel. \par -s/p High Resolution CT (September) - residual. s/p CT 3/2022 - next 3/2023\par -CAT scans are the only radiological modality to identify abnormalities w/in the lings with regards to nodules/masses/lymph nodes. Risks, benefits were reviewed in detail. The guidelines for abnormalities include follow up CT scans at various intervals which could range from 6 weeks to 1 year intervals. If there is a change for the worse then considerations for a biopsy will be considered if you are a candidate. Second opinion evaluation with thoracic surgeon or an interventional radiologist could be offered,			 \par \par Problem 2: GERD\par -continue Pepcid 40mg QHS \par - Things to avoid including overeating, spicy foods, tight clothing, eating within three hours of bed, this list is not all inclusive.\par - For treatment of reflux, possible options discussed including diet control, H2 blockers, PPIs, as well as coating motility agents discussed as treatment options. Timing of meals and proximity of last meal to sleep were discussed. If symptoms persist, a formal gastrointestinal evaluation is needed. \par \par Problem 3:?RLS\par -Studies to complete: iron studies, thyroid function test, free and total testosterone level.\par - Restless Legs Syndrome (RLS), also known as Douglas-Ekbom Disease, is a common sleep -related movement disorder. About 1 in 10 adults in the [place] have problems from restless leg syndrome. It also can be seen in about 2% of children. Women are twice as likely as men to have RLS. People with RLS will have symptoms most often during times when they are less active, especially at bedtime. RLS most often causes an overwhelming urge to move your legs and sometimes other parts of your body. This urge is associated with unpleasant sensations in different parts of th body. The symptoms can be mild to severe and can affect your ability to go to sleep and stay asleep. People with RLS often sleep less at night and feel more tired during the day. \par \par Problem 4: ?ERASTO (Neck size 16.5)\par -Complete Home Sleep Study -\par -Sleep apnea is associated with adverse clinical consequences which an affect most organ systems. Cardiovascular disease risk includes arrhythmias, atrial fibrillation, hypertension, coronary artery disease, and stroke. Metabolic disorders include diabetes type 2, non-alcoholic fatty liver disease. Mood disorder especially depression; and cognitive decline especially in the elderly. Associations with chronic reflux/Song’s esophagus some but not all inclusive. \par -Reasons include arousal consistent with hypopnea; respiratory events most prominent in REM sleep or supine position; therefore sleep staging and body position are important for accurate diagnosis and estimation of AHI. \par \par Problem 5:IlDz ?Bronchiectasis \par -s/p Full PFTs and 6 minute walk test. \par -Seen on the CT of the chest or chest x-ray signifies damaged bronchial tubes focal or diffuse which can be sites of recurrent infections. These areas can be colonized by various organisms including bacteria (hemophilous influenza/Pseudomonas species etc.) as well as acid fast bacilli (myobacterial disease- inclusive of TB/NIMCO etc.). Sputum either for bacteria culture/sensitivity or AFB culture and sensitivity will need to be sent if the patient has sputum- 3 specimens on consecutive days will need to be dropped at the laboratory- if the patient can produce sputum. \par \par Problem 6: s/p COVID-19 (4/2020, 5/2022) - vaccine concerns \par -Recommended Frank Kowalski's 10-day detox diet and book. Then, complete a course of probiotics.\par -s/p Blood Test for COVID-19 Antibody (+)\par \par Problem 7: Health maintenance\par -recommended book "[employer]" by Ta Rebollar\par - flu shot deferred 2022\par -recommended strep pneumonia vaccines: Prevnar-13 vaccine, followed by Pneumo vaccine 23 one year following (after the age of 65)\par -recommended early intervention for URIs\par -recommended regular osteoporosis evaluations\par -recommended early dermatological evaluations\par -recommended after the age of 50 to the age of 70, colonoscopy every 5 years\par \par f/u in 4 months with a CT\par pt is encouraged to call or fax the office with any questions or concerns.

## 2022-12-06 NOTE — PROCEDURE
[FreeTextEntry1] : Full PFT revealed normal flows, with a FEV1 of 4.50L, which is 121% of predicted, normal lung volumes, and a diffusion of 30.1, which is 119% of predicted, with no inspiratory limb \par \par Feno was 33; a normal value being less than 25. Fractional exhaled nitric oxide (FENO) is regarded as a simple, noninvasive method for assessing eosinophilic airway inflammation. Produced by a variety of cells within the lung, nitric oxide (NO) concentrations are generally low in healthy individuals. However, high concentrations of NO appear to be involved in nonspecific host defense mechanisms and chronic inflammatory  diseases such as asthma. The American Thoracic Society (ATS) therefore recommended using FENO to aid in the diagnosis and monitoring of eosinophilic airway inflammation and asthma, and for identifying steroid responsive individuals whose chronic respiratory symptoms may be caused by airway inflammation

## 2022-12-06 NOTE — HISTORY OF PRESENT ILLNESS
[TextBox_4] : Mr. MYRANDA LIRIANO is a 57 year old male with a history of abnormal CT, GERD, PNA from Covid-19, poor sleep, RLS, and SOB coming into the office for a follow up pulmonary evaluation. His chief complaint is\par \par -he notes feeling generally well\par -he notes that he has gained weight \par -he notes his sense of smell and taste are normal\par -he notes feeling more fatigued later in the day than usual\par -his wife notes that he snores at times\par -he notes exercising inconsistently (treadmill)\par -he notes he is borderline IBS/IBD\par -s/p URI\par \par -patient denies any headaches, nausea, vomiting, fever, chills, sweats, chest pain, chest pressure, palpitations, coughing, wheezing, diarrhea, constipation, dysphagia, myalgias, dizziness, leg swelling, leg pain, itchy eyes, itchy ears, heartburn, reflux or sour taste in the mouth

## 2022-12-06 NOTE — ADDENDUM
[FreeTextEntry1] : Documented by Scot Mcdonald acting as a scribe for Dr. Al De La Rosa on 12/06/2022.\par \par All medical record entries made by the Scribe were at my, Dr. Al De La Rosa's, direction and personally dictated by me on 12/06/2022. I have reviewed the chart and agree that the record accurately reflects my personal performance of the history, physical exam, assessment and plan. I have also personally directed, reviewed, and agree with the discharge instructions.

## 2023-01-20 ENCOUNTER — APPOINTMENT (OUTPATIENT)
Dept: CARDIOLOGY | Facility: CLINIC | Age: 58
End: 2023-01-20

## 2023-02-27 ENCOUNTER — APPOINTMENT (OUTPATIENT)
Dept: CARDIOLOGY | Facility: CLINIC | Age: 58
End: 2023-02-27

## 2023-03-06 ENCOUNTER — APPOINTMENT (OUTPATIENT)
Dept: CARDIOLOGY | Facility: CLINIC | Age: 58
End: 2023-03-06

## 2023-05-08 ENCOUNTER — APPOINTMENT (OUTPATIENT)
Dept: ORTHOPEDIC SURGERY | Facility: CLINIC | Age: 58
End: 2023-05-08

## 2023-06-06 ENCOUNTER — APPOINTMENT (OUTPATIENT)
Dept: PULMONOLOGY | Facility: CLINIC | Age: 58
End: 2023-06-06

## 2024-02-22 ENCOUNTER — APPOINTMENT (OUTPATIENT)
Dept: OTOLARYNGOLOGY | Facility: CLINIC | Age: 59
End: 2024-02-22

## 2024-09-03 ENCOUNTER — NON-APPOINTMENT (OUTPATIENT)
Age: 59
End: 2024-09-03

## 2024-09-04 ENCOUNTER — NON-APPOINTMENT (OUTPATIENT)
Age: 59
End: 2024-09-04

## 2024-09-04 ENCOUNTER — APPOINTMENT (OUTPATIENT)
Dept: CARDIOLOGY | Facility: CLINIC | Age: 59
End: 2024-09-04
Payer: COMMERCIAL

## 2024-09-04 VITALS
WEIGHT: 199 LBS | SYSTOLIC BLOOD PRESSURE: 146 MMHG | HEIGHT: 70 IN | TEMPERATURE: 97.8 F | OXYGEN SATURATION: 98 % | HEART RATE: 62 BPM | RESPIRATION RATE: 16 BRPM | BODY MASS INDEX: 28.49 KG/M2 | DIASTOLIC BLOOD PRESSURE: 84 MMHG

## 2024-09-04 DIAGNOSIS — R01.1 CARDIAC MURMUR, UNSPECIFIED: ICD-10-CM

## 2024-09-04 DIAGNOSIS — R06.09 OTHER FORMS OF DYSPNEA: ICD-10-CM

## 2024-09-04 DIAGNOSIS — I25.10 ATHEROSCLEROTIC HEART DISEASE OF NATIVE CORONARY ARTERY W/OUT ANGINA PECTORIS: ICD-10-CM

## 2024-09-04 DIAGNOSIS — E78.00 PURE HYPERCHOLESTEROLEMIA, UNSPECIFIED: ICD-10-CM

## 2024-09-04 PROCEDURE — G2211 COMPLEX E/M VISIT ADD ON: CPT

## 2024-09-04 PROCEDURE — 99214 OFFICE O/P EST MOD 30 MIN: CPT

## 2024-09-04 PROCEDURE — 93000 ELECTROCARDIOGRAM COMPLETE: CPT

## 2024-09-04 RX ORDER — ROSUVASTATIN CALCIUM 5 MG/1
5 TABLET, FILM COATED ORAL DAILY
Qty: 90 | Refills: 1 | Status: ACTIVE | COMMUNITY
Start: 2024-09-04 | End: 1900-01-01

## 2024-09-04 RX ORDER — EZETIMIBE 10 MG/1
10 TABLET ORAL DAILY
Qty: 90 | Refills: 1 | Status: ACTIVE | COMMUNITY
Start: 2024-09-04 | End: 1900-01-01

## 2024-09-04 NOTE — REASON FOR VISIT
[CV Risk Factors and Non-Cardiac Disease] : CV risk factors and non-cardiac disease [FreeTextEntry1] : This is a 59 year old male patient with a past medical history of Alopecia, GERD, hypercholesterolemia, Crohn's colitis, s/p COVID-19 in April 2020 and 5/2022 who comes in for lipid follow up.   Patient states he is generally feeling well today. Denies chest pain, shortness of breath, palpitations.  Patient reports he would like to discuss medication adjustment and recent NMR Lipoprofile which revealed cholesterol 135, triglycerides 82, HDL 56,  LDL calc 63   Patient had a total coronary calcium score of 12.12 (LAD) on 8/7/20. Patient provided recent bloodwork from 9/13/22 that demonstrated cholesterol 224, triglycerides 117, HDL 51, calculated . Patient is currently taking Rosuvastatin 10mg QD.   Patient states his father had an MI last year at the age of 95 (1 vessel was over 90% occluded and he had 1 stent placed).  Patient states he exercises 3 times a week and runs 2 miles at a time. Patient reports normal exercise stress echo a few years ago.

## 2024-09-04 NOTE — DISCUSSION/SUMMARY
[FreeTextEntry1] : This is a 59-year-old male with past medical history significant for mild coronary artery calcification, prediabetes, borderline cholesterol, status post COVID-19 infection in 2020, and May 2022, dyspepsia, who comes in for cardiac consultation. He denies chest pain, shortness of breath, dizziness or syncope.  He may get occasional dyspnea on exertion.  He has no history of rheumatic fever.  He does not drink excessive caffeine or alcohol. His cardiac risk factors include borderline elevation in cholesterol, and prediabetes as a cardiovascular risk enhancing feature.  (The patient had normal lipoprotein a of less than 75 nmol/L). Family history: (His father myocardial infarction age 95 and received a stent) Electrocardiogram done September 4, 2024 demonstrated normal sinus rhythm rate 62 bpm is otherwise unremarkable. I have recommended he schedule coronary CTA to evaluate his symptoms, rule out significant coronary artery disease. Lipid panel done July 12, 2024 (on Crestor 5 mg every other day) demonstrated LDL particle number of 838 nmol/L, LDL calculated 59 mg/dL, triglycerides 82 mg/dL, cholesterol 135 mg/dL, hemoglobin A1c of 6.1. The patient feels that he developed muscle aches, Crestor 10 mg dosage and was taking it as 1/2 tablet (5 mg) every other day. He may be a candidate for Leqvio therapy, he is not interested in biweekly PCSK9 injectable therapy. I have offered him combination of pravastatin and Zetia to achieve his greater than 50% reduction in his LDL cholesterol. He prefers to take the 5 mg dose of Crestor with the Zetia therapy and will have blood work done in 6 to 8 weeks. (His father myocardial infarction age 95 and received a stent) Electrocardiogram done September 16, 2022 demonstrates sinus bradycardia rate of 55 bpm and is otherwise remarkable for nonspecific ST-T wave change. Coronary artery calcium score done August 7, 2020 which demonstrated 12 units in the left anterior descending artery and 0 units in the remainder of the coronary tree. Patient had initially put himself on Crestor 10 mg daily, which he discontinued after some vague muscle aches. Lipid panel done November 19, 2021 demonstrated cholesterol 152, triglycerides 78, HDL of 48, LDL cholesterol calculated of 89 mg/dL with hemoglobin A1c of 5.9. The patient then restarted Crestor 10 mg/day.  He is currently on Crestor 10 mg/day therapy. Given the presence of coronary artery calcification, the patient's LDL target is 70 mg/dL. I have asked him to increase his Crestor to 20 mg/day and repeat his blood work in 6 to 8 weeks.  He will also start coenzyme Q 10. The patient exercises on a regular basis, running a few miles per week.  He feels that his diet can improve.  I have recommended he work with a registered dietitian, to assist with his heart healthy diet and caloric reduction. He will follow-up with me after above-noted diagnostic tests are completed.  The patient understands that aerobic exercises must be increased to 40 minutes 4 times per week. A detailed discussion of lifestyle modification was done today. The patient has a good understanding of the diagnosis, and treatment plan. Lifestyle modification was also outlined.

## 2024-10-07 ENCOUNTER — APPOINTMENT (OUTPATIENT)
Dept: PULMONOLOGY | Facility: CLINIC | Age: 59
End: 2024-10-07
Payer: COMMERCIAL

## 2024-10-07 VITALS
WEIGHT: 199.1 LBS | BODY MASS INDEX: 28.5 KG/M2 | OXYGEN SATURATION: 98 % | SYSTOLIC BLOOD PRESSURE: 134 MMHG | HEART RATE: 96 BPM | HEIGHT: 70 IN | DIASTOLIC BLOOD PRESSURE: 84 MMHG | TEMPERATURE: 97.7 F | RESPIRATION RATE: 16 BRPM

## 2024-10-07 DIAGNOSIS — G25.81 RESTLESS LEGS SYNDROME: ICD-10-CM

## 2024-10-07 DIAGNOSIS — R79.89 OTHER SPECIFIED ABNORMAL FINDINGS OF BLOOD CHEMISTRY: ICD-10-CM

## 2024-10-07 DIAGNOSIS — Z72.820 SLEEP DEPRIVATION: ICD-10-CM

## 2024-10-07 DIAGNOSIS — U07.1 COVID-19: ICD-10-CM

## 2024-10-07 DIAGNOSIS — J12.82 COVID-19: ICD-10-CM

## 2024-10-07 DIAGNOSIS — R06.02 SHORTNESS OF BREATH: ICD-10-CM

## 2024-10-07 DIAGNOSIS — R93.89 ABNORMAL FINDINGS ON DIAGNOSTIC IMAGING OF OTHER SPECIFIED BODY STRUCTURES: ICD-10-CM

## 2024-10-07 DIAGNOSIS — K21.9 GASTRO-ESOPHAGEAL REFLUX DISEASE W/OUT ESOPHAGITIS: ICD-10-CM

## 2024-10-07 PROCEDURE — 94010 BREATHING CAPACITY TEST: CPT

## 2024-10-07 PROCEDURE — 94729 DIFFUSING CAPACITY: CPT

## 2024-10-07 PROCEDURE — 94727 GAS DIL/WSHOT DETER LNG VOL: CPT

## 2024-10-07 PROCEDURE — ZZZZZ: CPT

## 2024-10-07 PROCEDURE — 99214 OFFICE O/P EST MOD 30 MIN: CPT | Mod: 25

## 2025-06-12 ENCOUNTER — APPOINTMENT (OUTPATIENT)
Dept: CARDIOLOGY | Facility: CLINIC | Age: 60
End: 2025-06-12
Payer: COMMERCIAL

## 2025-06-12 VITALS
DIASTOLIC BLOOD PRESSURE: 78 MMHG | HEART RATE: 53 BPM | TEMPERATURE: 98.2 F | SYSTOLIC BLOOD PRESSURE: 125 MMHG | BODY MASS INDEX: 27.63 KG/M2 | OXYGEN SATURATION: 98 % | HEIGHT: 70 IN | WEIGHT: 193 LBS | RESPIRATION RATE: 16 BRPM

## 2025-06-12 PROCEDURE — G2211 COMPLEX E/M VISIT ADD ON: CPT | Mod: NC

## 2025-06-12 PROCEDURE — 99214 OFFICE O/P EST MOD 30 MIN: CPT

## 2025-06-12 PROCEDURE — 93000 ELECTROCARDIOGRAM COMPLETE: CPT

## 2025-06-17 RX ORDER — PSYLLIUM HUSK 0.4 G
CAPSULE ORAL
Refills: 0 | Status: ACTIVE | COMMUNITY

## 2025-07-07 ENCOUNTER — APPOINTMENT (OUTPATIENT)
Dept: PULMONOLOGY | Facility: CLINIC | Age: 60
End: 2025-07-07
Payer: COMMERCIAL

## 2025-07-07 VITALS
SYSTOLIC BLOOD PRESSURE: 112 MMHG | OXYGEN SATURATION: 98 % | HEIGHT: 70 IN | WEIGHT: 193 LBS | BODY MASS INDEX: 27.63 KG/M2 | HEART RATE: 91 BPM | TEMPERATURE: 98.2 F | RESPIRATION RATE: 16 BRPM | DIASTOLIC BLOOD PRESSURE: 68 MMHG

## 2025-07-07 PROCEDURE — 94010 BREATHING CAPACITY TEST: CPT

## 2025-07-07 PROCEDURE — 99214 OFFICE O/P EST MOD 30 MIN: CPT | Mod: 25

## 2025-07-07 PROCEDURE — 94727 GAS DIL/WSHOT DETER LNG VOL: CPT

## 2025-07-07 PROCEDURE — 94729 DIFFUSING CAPACITY: CPT
